# Patient Record
(demographics unavailable — no encounter records)

---

## 2025-06-28 NOTE — HISTORY OF PRESENT ILLNESS
[FreeTextEntry1] : CC: Annual gyn. No c/o. Requests pap smear   LMP: 2015   POBGYN: G0   PAP: 04/2024 Mammo: 09/2024 Colonoscopy: pt has appt on 07/12/2025 DEXA: 2024

## 2025-06-28 NOTE — PHYSICAL EXAM
[MA] : MA [FreeTextEntry2] : Viry [Alert] : alert [Appropriately responsive] : appropriately responsive [No Acute Distress] : no acute distress [No Lymphadenopathy] : no lymphadenopathy [Non-tender] : non-tender [Non-distended] : non-distended [Oriented x3] : oriented x3 [Examination Of The Breasts] : a normal appearance [No Masses] : no breast masses were palpable [Labia Majora] : normal [Labia Minora] : normal [Normal] : normal [Uterine Adnexae] : normal